# Patient Record
Sex: MALE | Race: WHITE | NOT HISPANIC OR LATINO | URBAN - METROPOLITAN AREA
[De-identification: names, ages, dates, MRNs, and addresses within clinical notes are randomized per-mention and may not be internally consistent; named-entity substitution may affect disease eponyms.]

---

## 2024-03-13 ENCOUNTER — INPATIENT HOSPITAL (OUTPATIENT)
Dept: URBAN - METROPOLITAN AREA HOSPITAL 104 | Facility: HOSPITAL | Age: 59
End: 2024-03-13

## 2024-03-13 DIAGNOSIS — I85.11 SECONDARY ESOPHAGEAL VARICES WITH BLEEDING: ICD-10-CM

## 2024-03-13 DIAGNOSIS — K70.31 ALCOHOLIC CIRRHOSIS OF LIVER WITH ASCITES: ICD-10-CM

## 2024-03-13 DIAGNOSIS — F10.10 ALCOHOL ABUSE, UNCOMPLICATED: ICD-10-CM

## 2024-03-13 DIAGNOSIS — D69.59 OTHER SECONDARY THROMBOCYTOPENIA: ICD-10-CM

## 2024-03-13 DIAGNOSIS — D68.4 ACQUIRED COAGULATION FACTOR DEFICIENCY: ICD-10-CM

## 2024-03-13 PROCEDURE — 43244 EGD VARICES LIGATION: CPT | Performed by: INTERNAL MEDICINE

## 2024-03-13 PROCEDURE — 99222 1ST HOSP IP/OBS MODERATE 55: CPT | Mod: 25 | Performed by: NURSE PRACTITIONER

## 2024-03-14 PROCEDURE — 99232 SBSQ HOSP IP/OBS MODERATE 35: CPT | Performed by: NURSE PRACTITIONER

## 2024-03-15 PROCEDURE — 99232 SBSQ HOSP IP/OBS MODERATE 35: CPT | Performed by: NURSE PRACTITIONER

## 2024-03-19 ENCOUNTER — OFFICE (OUTPATIENT)
Dept: URBAN - METROPOLITAN AREA CLINIC 16 | Facility: CLINIC | Age: 59
End: 2024-03-19

## 2024-03-19 VITALS
WEIGHT: 291 LBS | DIASTOLIC BLOOD PRESSURE: 74 MMHG | HEIGHT: 71 IN | OXYGEN SATURATION: 98 % | HEART RATE: 98 BPM | SYSTOLIC BLOOD PRESSURE: 124 MMHG

## 2024-03-19 DIAGNOSIS — K70.31 ALCOHOLIC CIRRHOSIS OF LIVER WITH ASCITES: ICD-10-CM

## 2024-03-19 DIAGNOSIS — I85.00 ESOPHAGEAL VARICES WITHOUT BLEEDING: ICD-10-CM

## 2024-03-19 DIAGNOSIS — F10.21 ALCOHOL DEPENDENCE, IN REMISSION: ICD-10-CM

## 2024-03-19 DIAGNOSIS — I86.4 GASTRIC VARICES: ICD-10-CM

## 2024-03-19 PROCEDURE — 99215 OFFICE O/P EST HI 40 MIN: CPT | Performed by: INTERNAL MEDICINE

## 2024-03-19 NOTE — SERVICEHPINOTES
Quentin Shrestha   is seen today for a follow-up visit.    he moved from [place] in the 1970s to the  and he has a history of hospitalization with known obesity with alcohol use and type 2 diabetes and on insulin with SANDIE psoriasis and admitted to [facility] on March 13 he had hemoglobin of 7.8 and was given 2 units of packed RBCs with hemoglobin improving to 8.5. His platelet count was noted to show low platelets and had evidence of thrombocytopenia. He had CTA with GI bleeding protocol and showed active upper GI bleed from the distal esophageal region with esophageal varices extending into the stomach and he was placed on octreotide and able to be weaned off with IV PPI twice a day he was given ceftriaxone for SBP prophylaxis due to bacterial translocation risk. He is noted to have decompensated cirrhosis and even large volume ascites on CAT scan.
magnolia Sen is present today with him as well.He did have an appointment with digestive specialists and that had already been made. Regardless we did a hospital follow-up at this time and with decompensated liver disease I will go ahead and make the appointment with [facility] with Dr. Martinez or Dr. Valencia for evaluation for transplant etc. his platelet count was low at 49 in the hospital. I was able to review the reports as well for the EGD and he did have band ligation x 4 and management of variceal hemorrhage. If he had rebleeding he would have been recommended for TIPS procedure as he does have likely gastric varices that could bleed as well

## 2024-03-19 NOTE — SERVICENOTES
He will need to follow-up with his medical team for diuretic management with edema etc. and cirrhosis decompensated disease Aldactone and Lasix recommended.  Regardless he will need to have close monitoring of electrolytes etc. and be under care of medical team with this management regardless he has ascites and variceal hemorrhage.  He had been on SBP prophylaxis he will be seen at [facility] for management and is abstinent from alcohol for a week now.  He is high risk regardless I have also recommended an EGD in 1 month with my team at [facility] for further banding of varices and reevaluation

## 2024-03-21 ENCOUNTER — ON CAMPUS - OUTPATIENT (OUTPATIENT)
Dept: URBAN - METROPOLITAN AREA HOSPITAL 105 | Facility: HOSPITAL | Age: 59
End: 2024-03-21

## 2024-03-21 DIAGNOSIS — D64.9 ANEMIA, UNSPECIFIED: ICD-10-CM

## 2024-03-21 DIAGNOSIS — D68.4 ACQUIRED COAGULATION FACTOR DEFICIENCY: ICD-10-CM

## 2024-03-21 DIAGNOSIS — D69.59 OTHER SECONDARY THROMBOCYTOPENIA: ICD-10-CM

## 2024-03-21 DIAGNOSIS — K70.31 ALCOHOLIC CIRRHOSIS OF LIVER WITH ASCITES: ICD-10-CM

## 2024-03-21 DIAGNOSIS — F10.11 ALCOHOL ABUSE, IN REMISSION: ICD-10-CM

## 2024-03-21 PROCEDURE — 99214 OFFICE O/P EST MOD 30 MIN: CPT | Performed by: NURSE PRACTITIONER

## 2024-04-29 ENCOUNTER — ON CAMPUS - OUTPATIENT (OUTPATIENT)
Dept: URBAN - METROPOLITAN AREA HOSPITAL 105 | Facility: HOSPITAL | Age: 59
End: 2024-04-29
Payer: COMMERCIAL

## 2024-04-29 DIAGNOSIS — I85.11 SECONDARY ESOPHAGEAL VARICES WITH BLEEDING: ICD-10-CM

## 2024-04-29 DIAGNOSIS — K70.31 ALCOHOLIC CIRRHOSIS OF LIVER WITH ASCITES: ICD-10-CM

## 2024-04-29 DIAGNOSIS — K31.89 OTHER DISEASES OF STOMACH AND DUODENUM: ICD-10-CM

## 2024-04-29 DIAGNOSIS — K76.6 PORTAL HYPERTENSION: ICD-10-CM

## 2024-04-29 PROCEDURE — 43235 EGD DIAGNOSTIC BRUSH WASH: CPT | Performed by: INTERNAL MEDICINE

## 2024-10-31 ENCOUNTER — APPOINTMENT (OUTPATIENT)
Dept: URBAN - METROPOLITAN AREA CLINIC 205 | Age: 59
Setting detail: DERMATOLOGY
End: 2024-10-31

## 2024-10-31 DIAGNOSIS — L40.0 PSORIASIS VULGARIS: ICD-10-CM

## 2024-10-31 PROCEDURE — 99203 OFFICE O/P NEW LOW 30 MIN: CPT

## 2024-10-31 PROCEDURE — OTHER COUNSELING: OTHER

## 2024-10-31 PROCEDURE — OTHER TREMFYA INITIATION: OTHER

## 2024-10-31 PROCEDURE — OTHER TREATMENT REGIMEN: OTHER

## 2024-10-31 ASSESSMENT — BSA PSORIASIS: % BODY COVERED IN PSORIASIS: 60

## 2024-10-31 ASSESSMENT — PGA PSORIASIS: PGA PSORIASIS 2020: SEVERE

## 2024-10-31 ASSESSMENT — ITCH NUMERIC RATING SCALE: HOW SEVERE IS YOUR ITCHING?: 9

## 2024-10-31 NOTE — HPI: RASH (PSORIASIS)
Is This A New Presentation, Or A Follow-Up?: Psoriasis
Additional History: First has a flare of Psoriasis 9 years ago. Patient has been seeing other derm for one year now. They diagnosed plaque psoriasis and prescribed topicals . Patient has been using topicals that arent working ( Clobetasol 0.05 % betamethone 0.064% ) for the past year . zoryve, vtama, clobetasol, calcipotriene, ketoconazole)has already tried vatama and zoreeve

## 2024-10-31 NOTE — PROCEDURE: TREMFYA INITIATION
Tremfya Monitoring Guidelines: A yearly test for tuberculosis is required while taking Tremfya.
Tremfya Dosing: 100 mg SC week 0 and week 4 then every 8 weeks thereafter
Is Soriatane Contraindicated?: No
Detail Level: Zone
Diagnosis (Required): Psoriasis
Pregnancy And Lactation Warning Text: The risk during pregnancy and breastfeeding is uncertain with this medication.

## 2024-10-31 NOTE — PROCEDURE: TREATMENT REGIMEN
Action 3: Continue
Plan: Unfortunately he has a very complicated medical history.   He needs something systemic to control his psoriasis.  I recommend tremfya as it has the least amount of side effects.  Tremfya can cause elevated liver enzymes.  Before we can start this we need to get clearance letters from his other providers including his PCP and gastroenterologist. I would also like medical records from the other dermatology office, Dr. Ivey. I would like copies of all of his labs from this year as well.  Jin agreed to help us obtain all of this information.  Once I have all of the information I will determine if he is still a candidate for tremfya.  If so we will pursue prior authorization.  He agreed to the plan.  He asked if there is anything OTC that can treat his psoriasis.  There is not.  He really needs systemic psoriasis treatment.
Detail Level: Generalized